# Patient Record
Sex: MALE | Race: BLACK OR AFRICAN AMERICAN | NOT HISPANIC OR LATINO | Employment: FULL TIME | URBAN - METROPOLITAN AREA
[De-identification: names, ages, dates, MRNs, and addresses within clinical notes are randomized per-mention and may not be internally consistent; named-entity substitution may affect disease eponyms.]

---

## 2019-05-29 ENCOUNTER — HOSPITAL ENCOUNTER (EMERGENCY)
Facility: MEDICAL CENTER | Age: 54
End: 2019-05-29
Attending: EMERGENCY MEDICINE
Payer: COMMERCIAL

## 2019-05-29 VITALS
WEIGHT: 188.93 LBS | BODY MASS INDEX: 23.01 KG/M2 | RESPIRATION RATE: 16 BRPM | HEIGHT: 76 IN | HEART RATE: 65 BPM | OXYGEN SATURATION: 99 % | SYSTOLIC BLOOD PRESSURE: 116 MMHG | DIASTOLIC BLOOD PRESSURE: 71 MMHG | TEMPERATURE: 97.5 F

## 2019-05-29 DIAGNOSIS — I88.9 CERVICAL ADENITIS: ICD-10-CM

## 2019-05-29 PROCEDURE — 700102 HCHG RX REV CODE 250 W/ 637 OVERRIDE(OP): Performed by: EMERGENCY MEDICINE

## 2019-05-29 PROCEDURE — A9270 NON-COVERED ITEM OR SERVICE: HCPCS | Performed by: EMERGENCY MEDICINE

## 2019-05-29 PROCEDURE — 99283 EMERGENCY DEPT VISIT LOW MDM: CPT

## 2019-05-29 RX ORDER — AMOXICILLIN AND CLAVULANATE POTASSIUM 500; 125 MG/1; MG/1
1 TABLET, FILM COATED ORAL 2 TIMES DAILY
Qty: 20 TAB | Refills: 0 | Status: SHIPPED | OUTPATIENT
Start: 2019-05-29 | End: 2019-06-08

## 2019-05-29 RX ORDER — AMOXICILLIN AND CLAVULANATE POTASSIUM 500; 125 MG/1; MG/1
1 TABLET, FILM COATED ORAL ONCE
Status: COMPLETED | OUTPATIENT
Start: 2019-05-29 | End: 2019-05-29

## 2019-05-29 RX ORDER — IBUPROFEN 600 MG/1
600 TABLET ORAL EVERY 6 HOURS PRN
Qty: 20 TAB | Refills: 0 | Status: SHIPPED | OUTPATIENT
Start: 2019-05-29

## 2019-05-29 RX ADMIN — AMOXICILLIN AND CLAVULANATE POTASSIUM 1 TABLET: 500; 125 TABLET, FILM COATED ORAL at 05:45

## 2019-05-29 NOTE — ED TRIAGE NOTES
"Van Madrid  Chief Complaint   Patient presents with   • Oral Swelling     Pt presents with a complaint of pains and swelling to left side of face that radiates to left ear. Pt states pain started appx 2-3 weeks ago and has gotten significantly worse. Pt was diagnosed with Peritonsilar abscess and had it drained here appx 5 years ago. Pt states he has had these multiple times.      Pt ambulatory to triage with above complaint.     /71   Pulse 65   Temp 36.4 °C (97.5 °F) (Temporal)   Resp 16   Ht 1.93 m (6' 4\")   Wt 85.7 kg (188 lb 15 oz)   SpO2 99%   BMI 23.00 kg/m²     Pt informed of triage process and encouraged to notify staff of any changes or concerns. Pt verbalized understanding of instructions. Pt placed back in lobby.     "

## 2019-05-29 NOTE — ED PROVIDER NOTES
"ED Provider Note    Scribed for Avni Stearns M.D. by Aditi Carter. 5/29/2019  5:25 AM    Primary care provider: Dorina Landry M.D.  Means of arrival: walk in  History obtained from: patient  History limited by: none     CHIEF COMPLAINT  Chief Complaint   Patient presents with   • Oral Swelling     Pt presents with a complaint of pains and swelling to left side of face that radiates to left ear. Pt states pain started appx 2-3 weeks ago and has gotten significantly worse. Pt was diagnosed with Peritonsilar abscess and had it drained here appx 5 years ago. Pt states he has had these multiple times.        HPI  Van Madrid is a 54 y.o. male who presents to the Emergency Department for evaluation of left sided facial swelling onset few days ago. He describes his symptoms as a \"warm\" sensation radiating from the left jaw up to his left ear. Patient reports some associated pain with swallowing. Patient states his current symptoms are consistent with when he had a peritonsillar abscess in 2011. He reports he has had the abscess multiple times since. Denies fever, tooth pain or ear pain.      HPI limited by patient being a poor historian.     REVIEW OF SYSTEMS  Pertinent positives include left sided facial swelling, pain with swallowing.   Pertinent negatives include no fever, tooth pain or ear pain.    See HPI for further details.     ROS limited by patient being a poor historian.     PAST MEDICAL HISTORY   has a past medical history of Hemorrhagic disorder (HCC).    SURGICAL HISTORY   has a past surgical history that includes nasal endoscopy (8/11/2016).    SOCIAL HISTORY  Social History   Substance Use Topics   • Smoking status: Former Smoker     Years: 3.00     Types: Cigarettes     Quit date: 1/1/1998   • Smokeless tobacco: Never Used   • Alcohol use Yes      Comment: socially      History   Drug Use   • Types: Inhaled     Comment: THC       FAMILY HISTORY  History reviewed. No pertinent family " "history.    CURRENT MEDICATIONS  Home Medications     Reviewed by Reid Murrieta R.N. (Registered Nurse) on 05/29/19 at 0501  Med List Status: Not Addressed   Medication Last Dose Status   hydrocodone-acetaminophen (NORCO) 5-325 MG Tab per tablet  Active   multivitamin (THERAGRAN) Tab  Active                ALLERGIES  No Known Allergies    PHYSICAL EXAM  VITAL SIGNS: /71   Pulse 65   Temp 36.4 °C (97.5 °F) (Temporal)   Resp 16   Ht 1.93 m (6' 4\")   Wt 85.7 kg (188 lb 15 oz)   SpO2 99%   BMI 23.00 kg/m²     Nursing note and vitals reviewed.  Constitutional: No distress.   HENT: Head is atraumatic. Oropharynx is moist. No peritonsillar abscess, Uvula is midline.   Eyes: Conjunctivae are normal. Pupils are equal, round, and reactive to light.   Neck: Tenderness of the left anterior cervical lymph node chain.  Cardiovascular: Normal peripheral perfusion  Respiratory: No respiratory distress.   Musculoskeletal: Normal range of motion.   Neurological: Alert. No focal deficits noted.    Skin: No rash.   Psych: Appropriate for clinical situation      COURSE & MEDICAL DECISION MAKING  Nursing notes, VS, PMSFHx reviewed in chart.     Review of past medical records shows the patient was seen in this ED in 2011 for possible peritonsillar abscess.      5:25 AM Patient seen and examined at bedside. Patient presents with shotty adenopathy like consistent with an infectious process, likely will benefit from antibiotic treatment. Patient was instructed to take the prescribed course of Augmentin and to take motrin as needed with any onset of pain. He was advised to follow up with his PCP with any prolonged symptoms. Differentials include cervical adenitis. The patient will return for new or worsening symptoms and is stable at the time of discharge.    DISPOSITION:  Patient will be discharged home in stable condition.    FOLLOW UP:  [facility], Emergency Dept  [address] " [id number] [phone]    If symptoms worsen    Dorina Landry M.D.  [address]  [place] [place] [id number] [phone]    Schedule an appointment as soon as possible for a visit         OUTPATIENT MEDICATIONS:  New Prescriptions    AMOXICILLIN-CLAVULANATE (AUGMENTIN) 500-125 MG TAB    Take 1 Tab by mouth 2 times a day for 10 days.    IBUPROFEN (MOTRIN) 600 MG TAB    Take 1 Tab by mouth every 6 hours as needed.     FINAL IMPRESSION  1. Cervical adenitis         Aditi BARRAZA (Scribe), am scribing for, and in the presence of, Avni Stearns M.D..  Electronically signed by: Aditi Carter ([place]), 5/29/2019  Avni BARRAZA M.D. personally performed the services described in this documentation, as scribed by Aditi Carter in my presence, and it is both accurate and complete. E.     The note accurately reflects work and decisions made by me.  Avni Stearns  5/29/2019  11:12 AM

## 2019-08-19 ENCOUNTER — OFFICE VISIT (OUTPATIENT)
Dept: URGENT CARE | Facility: PHYSICIAN GROUP | Age: 54
End: 2019-08-19
Payer: COMMERCIAL

## 2019-08-19 VITALS
DIASTOLIC BLOOD PRESSURE: 70 MMHG | WEIGHT: 189 LBS | TEMPERATURE: 98.6 F | HEART RATE: 74 BPM | OXYGEN SATURATION: 98 % | SYSTOLIC BLOOD PRESSURE: 116 MMHG | HEIGHT: 76 IN | RESPIRATION RATE: 16 BRPM | BODY MASS INDEX: 23.02 KG/M2

## 2019-08-19 DIAGNOSIS — J02.9 PHARYNGITIS, UNSPECIFIED ETIOLOGY: Primary | ICD-10-CM

## 2019-08-19 PROCEDURE — 99214 OFFICE O/P EST MOD 30 MIN: CPT | Performed by: PHYSICIAN ASSISTANT

## 2019-08-19 RX ORDER — AMOXICILLIN 500 MG/1
500 CAPSULE ORAL 2 TIMES DAILY
Qty: 20 CAP | Refills: 0 | Status: SHIPPED | OUTPATIENT
Start: 2019-08-19 | End: 2019-08-29

## 2019-08-19 RX ORDER — MONTELUKAST SODIUM 10 MG/1
TABLET ORAL
COMMUNITY
Start: 2019-07-13

## 2019-08-19 ASSESSMENT — ENCOUNTER SYMPTOMS
HOARSE VOICE: 1
NAUSEA: 0
SPUTUM PRODUCTION: 0
FEVER: 0
CONSTIPATION: 0
SORE THROAT: 1
VOMITING: 0
SWOLLEN GLANDS: 1
SHORTNESS OF BREATH: 0
ABDOMINAL PAIN: 0
COUGH: 1
CHILLS: 0
DIARRHEA: 0

## 2019-08-20 NOTE — PROGRESS NOTES
Subjective:   Van Madrid is a 54 y.o. male who presents for Sore Throat (X 3 weeks off and on )        Pharyngitis    This is a new problem. Episode onset: 3 weeks  The problem has been unchanged. The fever has been present for 1 to 2 days. Associated symptoms include congestion, coughing, a hoarse voice and swollen glands. Pertinent negatives include no abdominal pain, diarrhea, ear discharge, ear pain, plugged ear sensation, shortness of breath or vomiting. He has had no exposure to strep or mono. He has tried NSAIDs for the symptoms. The treatment provided mild relief.     Review of Systems   Constitutional: Negative for chills, fever and malaise/fatigue.   HENT: Positive for congestion, hoarse voice and sore throat. Negative for ear discharge and ear pain.    Respiratory: Positive for cough. Negative for sputum production and shortness of breath.    Gastrointestinal: Negative for abdominal pain, constipation, diarrhea, nausea and vomiting.   All other systems reviewed and are negative.      PMH:  has a past medical history of Hemorrhagic disorder (HCC).  MEDS:   Current Outpatient Medications:   •  montelukast (SINGULAIR) 10 MG Tab, , Disp: , Rfl:   •  amoxicillin (AMOXIL) 500 MG Cap, Take 1 Cap by mouth 2 times a day for 10 days., Disp: 20 Cap, Rfl: 0  •  ibuprofen (MOTRIN) 600 MG Tab, Take 1 Tab by mouth every 6 hours as needed., Disp: 20 Tab, Rfl: 0  •  multivitamin (THERAGRAN) Tab, Take 1 Tab by mouth every day., Disp: , Rfl:   •  hydrocodone-acetaminophen (NORCO) 5-325 MG Tab per tablet, Take 1 Tab by mouth every 6 hours as needed., Disp: 15 Tab, Rfl: 0  ALLERGIES: No Known Allergies  SURGHX:   Past Surgical History:   Procedure Laterality Date   • NASAL ENDOSCOPY  8/11/2016    Procedure: NASAL ENDOSCOPY sinus with biopsy ;  Surgeon: Frantz Leon M.D.;  Location: SURGERY SAME DAY [facility];  Service:      SOCHX:  reports that he quit smoking about 21 years ago. His smoking use included  "cigarettes. He quit after 3.00 years of use. He has never used smokeless tobacco. He reports that he drinks alcohol. He reports that he has current or past drug history. Drug: Inhaled.  No family history on file.     Objective:   /70   Pulse 74   Temp 37 °C (98.6 °F)   Resp 16   Ht 1.93 m (6' 4\")   Wt 85.7 kg (189 lb)   SpO2 98%   BMI 23.01 kg/m²     Physical Exam   Constitutional: He is oriented to person, place, and time. He appears well-developed and well-nourished. No distress.   HENT:   Head: Normocephalic and atraumatic.   Right Ear: Tympanic membrane and ear canal normal.   Nose: Nose normal.   Mouth/Throat: Uvula is midline and mucous membranes are normal. Posterior oropharyngeal erythema present. No tonsillar exudate.   Left canal cerumen impacted   Eyes: Pupils are equal, round, and reactive to light. Conjunctivae are normal.   Neck: Normal range of motion. Neck supple. No tracheal deviation present. Thyromegaly: amox.   Cardiovascular: Normal rate and regular rhythm.   Pulmonary/Chest: Effort normal and breath sounds normal. No stridor. No respiratory distress. He has no wheezes.   Lymphadenopathy:     He has cervical adenopathy.   Neurological: He is alert and oriented to person, place, and time.   Skin: Skin is warm and dry. Capillary refill takes less than 2 seconds.   Psychiatric: He has a normal mood and affect. His behavior is normal.   Vitals reviewed.        Assessment/Plan:     1. Pharyngitis, unspecified etiology  amoxicillin (AMOXIL) 500 MG Cap    CANCELED: POCT Rapid Strep A     Supportive care reviewed including: warm salt water gargles. Pharyngitis educational handout given to patient. Infection control and and hand hygiene reviewed.     Follow-up with primary care provider.  If symptoms worsen or persist patient can return to clinic for reevaluation.  All side effects of medication discussed including allergic response, GI upset, tendon injury, etc. Patient verbalized " understanding of information.    Please note that this dictation was created using voice recognition software. I have made every reasonable attempt to correct obvious errors, but I expect that there are errors of grammar and possibly content that I did not discover before finalizing the note.

## 2019-08-20 NOTE — PATIENT INSTRUCTIONS
Pharyngitis  Pharyngitis is a sore throat (pharynx). There is redness, pain, and swelling of your throat.  Follow these instructions at home:  · Drink enough fluids to keep your pee (urine) clear or pale yellow.  · Only take medicine as told by your doctor.  ¨ You may get sick again if you do not take medicine as told. Finish your medicines, even if you start to feel better.  ¨ Do not take aspirin.  · Rest.  · Rinse your mouth (gargle) with salt water (½ tsp of salt per 1 qt of water) every 1-2 hours. This will help the pain.  · If you are not at risk for choking, you can suck on hard candy or sore throat lozenges.  Contact a doctor if:  · You have large, tender lumps on your neck.  · You have a rash.  · You cough up green, yellow-brown, or bloody spit.  Get help right away if:  · You have a stiff neck.  · You drool or cannot swallow liquids.  · You throw up (vomit) or are not able to keep medicine or liquids down.  · You have very bad pain that does not go away with medicine.  · You have problems breathing (not from a stuffy nose).  This information is not intended to replace advice given to you by your health care provider. Make sure you discuss any questions you have with your health care provider.  Document Released: 06/05/2009 Document Revised: 05/25/2017 Document Reviewed: 08/25/2014  [employer] Patient Education © 2017 [employer]